# Patient Record
Sex: MALE | Race: WHITE | NOT HISPANIC OR LATINO | URBAN - METROPOLITAN AREA
[De-identification: names, ages, dates, MRNs, and addresses within clinical notes are randomized per-mention and may not be internally consistent; named-entity substitution may affect disease eponyms.]

---

## 2017-07-25 NOTE — PATIENT DISCUSSION
CATARACTS, OU - VISUALLY SIGNIFICANT. SCHEDULE O__ FIRST THEN LATER IN O__ IF VISUAL SYMPTOMS PERSIST.

## 2017-08-08 NOTE — PATIENT DISCUSSION
Continue: Pred Forte (prednisolone acetate): drops,suspension: 1% 1 drop every other day into affected eye 07-

## 2017-09-27 NOTE — PATIENT DISCUSSION
(Y06.304) Keratoconjunct sicca, not specified as Sjogren's, bilateral - Assesment : Examination revealed Dry Eye Syndrome - Plan : Monitor for changes. ATs recommended daily prn.

## 2017-09-27 NOTE — PATIENT DISCUSSION
(H25.13) Age-related nuclear cataract, bilateral - Assesment : Examination revealed cataract. - Plan : Monitor for changes. Advised patient to call our office with decreased vision or increased symptoms. RTC in 1 year for Exam, sooner if problems or changes occur.

## 2017-09-27 NOTE — PATIENT DISCUSSION
(H40.033) Anatomical narrow angle, bilateral - Assesment : Examination revealed hx narrow angle OU.   s/p LPI OU. Open now. - Plan : Monitor. Call with any problems, changes, or angle closure symptoms.

## 2018-10-01 NOTE — PATIENT DISCUSSION
(X93.318) Keratoconjunct sicca, not specified as Sjogren's, bilateral - Assesment : Examination revealed Dry Eye Syndrome - Plan : Monitor for changes. ATs prn daily.

## 2018-10-30 NOTE — PATIENT DISCUSSION
DRY EYES : Discussed with patient the importance of keeping the eye moist and the symptoms associated with dry eyes including blurry vision, tearing, burning, and altaf sensation. Advised patient to minimize use of any fans blowing directly on the face. Advised patient to continue with artificial tears 2-3 times daily.

## 2019-06-06 NOTE — PATIENT DISCUSSION
spherecylinderaxisaddprismvertexVAInt VANVExaminer Patient/Caregiver provided printed discharge information.

## 2019-10-02 NOTE — PATIENT DISCUSSION
(H25.13) Age-related nuclear cataract, bilateral - Assesment : Examination revealed cataract. Myopic shift noted. - Plan : Monitor for changes. Advised patient of condition of cataracts and explained vision changes are caused by cataracts. Discussed symptoms of cataracts worsening and becoming more bothersome. Updated GLRx given today. Pt to call if vision changes or becomes more bothered by visual symptoms before next appt. RTC in 1 year for Exam, sooner if problems or changes occur.

## 2020-10-28 NOTE — PATIENT DISCUSSION
Monitor for changes. Advised patient of condition of cataracts and explained vision changes are caused by cataracts. Discussed symptoms of cataracts worsening and becoming more bothersome. Pt to call if vision changes or becomes more bothered by visual symptoms before next appt.

## 2021-11-16 ENCOUNTER — NEW PATIENT (OUTPATIENT)
Dept: URBAN - METROPOLITAN AREA CLINIC 17 | Facility: CLINIC | Age: 66
End: 2021-11-16

## 2021-11-16 DIAGNOSIS — H01.024: ICD-10-CM

## 2021-11-16 DIAGNOSIS — H25.13: ICD-10-CM

## 2021-11-16 DIAGNOSIS — H01.021: ICD-10-CM

## 2021-11-16 PROCEDURE — 99204 OFFICE O/P NEW MOD 45 MIN: CPT

## 2021-11-16 PROCEDURE — 92015 DETERMINE REFRACTIVE STATE: CPT

## 2021-11-16 ASSESSMENT — TONOMETRY
OD_IOP_MMHG: 10
OS_IOP_MMHG: 10

## 2021-11-16 ASSESSMENT — VISUAL ACUITY
OD_SC: 20/30-2
OS_GLARE: 20/100
OD_GLARE: 20/100
OS_SC: 20/30

## 2021-11-30 NOTE — PATIENT DISCUSSION
Monitor for changes. Advised patient of condition of cataracts and option of CE once becomes more bothered. Discussed symptoms of cataracts worsening and becoming more bothersome. Pt to call if vision changes or becomes more bothered by visual symptoms before next appt.

## 2022-06-01 NOTE — PATIENT DISCUSSION
Pt reports had cardiac arrest in December and wants to discuss with Cardiologist at upcoming appt before decides to proceed. Schedule A-scan appt when pt calls.

## 2022-06-01 NOTE — PATIENT DISCUSSION
Advised Pt of condition of cataracts and explained that worsening of cataracts has changed Pt's vision.

## 2022-06-01 NOTE — PATIENT DISCUSSION
Pt is bothered and symptomatic. Recommended CE to improve visual function. Discussed procedure, risks, benefits, and lens options.

## 2022-11-30 ENCOUNTER — ESTABLISHED PATIENT (OUTPATIENT)
Dept: URBAN - METROPOLITAN AREA CLINIC 17 | Facility: CLINIC | Age: 67
End: 2022-11-30

## 2022-11-30 DIAGNOSIS — H25.13: ICD-10-CM

## 2022-11-30 DIAGNOSIS — H01.024: ICD-10-CM

## 2022-11-30 DIAGNOSIS — H01.021: ICD-10-CM

## 2022-11-30 PROCEDURE — 92014 COMPRE OPH EXAM EST PT 1/>: CPT

## 2022-11-30 PROCEDURE — 92015 DETERMINE REFRACTIVE STATE: CPT

## 2022-11-30 ASSESSMENT — VISUAL ACUITY
OD_CC: 20/20-1
OD_GLARE: 20/200
OS_CC: 20/20
OS_GLARE: 20/200

## 2022-11-30 ASSESSMENT — TONOMETRY
OD_IOP_MMHG: 12
OS_IOP_MMHG: 12

## 2023-11-02 ENCOUNTER — ESTABLISHED PATIENT (OUTPATIENT)
Dept: URBAN - METROPOLITAN AREA CLINIC 12 | Facility: CLINIC | Age: 68
End: 2023-11-02

## 2023-11-02 DIAGNOSIS — H52.4: ICD-10-CM

## 2023-11-02 DIAGNOSIS — H25.13: ICD-10-CM

## 2023-11-02 DIAGNOSIS — H04.123: ICD-10-CM

## 2023-11-02 PROCEDURE — 92015 DETERMINE REFRACTIVE STATE: CPT

## 2023-11-02 PROCEDURE — 92014 COMPRE OPH EXAM EST PT 1/>: CPT

## 2023-11-02 ASSESSMENT — TONOMETRY
OS_IOP_MMHG: 11
OD_IOP_MMHG: 11

## 2023-11-02 ASSESSMENT — VISUAL ACUITY
OD_SC: 20/30-1
OS_SC: 20/30-1

## 2025-06-26 ENCOUNTER — COMPREHENSIVE EXAM (OUTPATIENT)
Age: 70
End: 2025-06-26

## 2025-06-26 DIAGNOSIS — H52.4: ICD-10-CM

## 2025-06-26 PROCEDURE — 92014 COMPRE OPH EXAM EST PT 1/>: CPT

## 2025-06-26 PROCEDURE — 92015 DETERMINE REFRACTIVE STATE: CPT
